# Patient Record
Sex: FEMALE | Race: WHITE
[De-identification: names, ages, dates, MRNs, and addresses within clinical notes are randomized per-mention and may not be internally consistent; named-entity substitution may affect disease eponyms.]

---

## 2017-08-30 ENCOUNTER — HOSPITAL ENCOUNTER (EMERGENCY)
Dept: HOSPITAL 41 - JD.ED | Age: 22
Discharge: HOME | End: 2017-08-30
Payer: COMMERCIAL

## 2017-08-30 VITALS — DIASTOLIC BLOOD PRESSURE: 78 MMHG | SYSTOLIC BLOOD PRESSURE: 112 MMHG

## 2017-08-30 DIAGNOSIS — G40.909: ICD-10-CM

## 2017-08-30 DIAGNOSIS — G44.209: Primary | ICD-10-CM

## 2017-08-30 DIAGNOSIS — G43.909: ICD-10-CM

## 2017-08-30 PROCEDURE — 96361 HYDRATE IV INFUSION ADD-ON: CPT

## 2017-08-30 PROCEDURE — 96374 THER/PROPH/DIAG INJ IV PUSH: CPT

## 2017-08-30 PROCEDURE — 96375 TX/PRO/DX INJ NEW DRUG ADDON: CPT

## 2017-08-30 PROCEDURE — 99283 EMERGENCY DEPT VISIT LOW MDM: CPT

## 2017-08-30 NOTE — EDM.PDOC
ED HPI GENERAL MEDICAL PROBLEM





- General


Chief Complaint: Headache


Stated Complaint: MIGRAINE


Time Seen by Provider: 08/30/17 21:40





- History of Present Illness


INITIAL COMMENTS - FREE TEXT/NARRATIVE: 





21-year-old female returns emergency room with a migraine headache.





This headache started last night is progressively gotten worse it's been 

associated with photophobia and some nausea. She vomited earlier this evening. 

Patient has almost daily headaches. Her headache is on her left side mostly she 

has some neck discomfort and muscle tightness on the left side of her neck.





Patient has a significant history of seizure disorder the patient is on Keppra 

500 mg twice daily.


  ** Headache


Pain Score (Numeric/FACES): 8





- Related Data


 Allergies











Allergy/AdvReac Type Severity Reaction Status Date / Time


 


No Known Allergies Allergy   Verified 08/30/17 21:40











Home Meds: 


 Home Meds





Riboflavin [Vitamin B-2] 25 mg PO DAILY 07/20/16 [History]


levETIRAcetam [Keppra] 2,000 mg PO BID 07/20/16 [History]


Magnesium 100 mg PO BID 08/30/17 [History]











Past Medical History





- Past Health History


Medical/Surgical History: Denies Medical/Surgical History


Neurological History: Reports: Other (See Below)


Other Neuro History: epilepsy





Social & Family History





- Tobacco Use


Smoking Status *Q: Never Smoker


Second Hand Smoke Exposure: No





- Alcohol Use


Days Per Week of Alcohol Use: 0





- Recreational Drug Use


Recreational Drug Use: No





ED ROS GENERAL





- Review of Systems


Review Of Systems: See Below


Constitutional: Reports: No Symptoms


HEENT: Reports: Other (She has some associated photophobia)


Respiratory: Reports: No Symptoms


Cardiovascular: Reports: No Symptoms


GI/Abdominal: Reports: No Symptoms


Musculoskeletal: Reports: Neck Pain (She has left-sided)


Neurological: Reports: Headache.  Denies: Pre-Existing Deficit, Seizure, Syncope


Psychiatric: Reports: No Symptoms





- Physical Exam


Exam: See Below


Exam Limited By: No Limitations


General Appearance: Alert, No Apparent Distress


Eye Exam: Bilateral Eye: EOMI, Normal Inspection, PERRL


Ears: Normal External Exam, Normal Canal, Hearing Grossly Normal, Normal TMs


Nose: Normal Inspection, Normal Mucosa, No Blood


Throat/Mouth: Normal Inspection, Normal Lips, Normal Teeth, Normal Gums, Normal 

Oropharynx, Normal Voice, No Airway Compromise


Head Exam: Atraumatic, Normocephalic


Neck: Normal Inspection, Supple, Full Range of Motion, Other (He has some left-

sided paraspinous muscle tenderness worse at the origination with the skull 

this could be a contributor to her headache.).  No: Lymphadenopathy (L), 

Lymphadenopathy (R)


Respiratory/Chest: No Respiratory Distress, Lungs Clear, Normal Breath Sounds


Cardiovascular: Regular Rate, Rhythm, No Edema, No Murmur


Neuro Exam (Abbreviated): Alert, Oriented, Normal Cognition, Other (Cranial 

nerves II through XII grossly intact all muscle groups in upper extremities 

recall appropriate bilaterally the patient can stand without difficulty no 

apparent muscle group weakness in the lower extremities cerebellar testing 

including finger to nose and drinking down her foot down the opposing leg are 

all negative deep tendon reflexes the brachial radialis are equal and 

appropriate bilaterally)


Back Exam: Normal Inspection, CVA Tenderness (L).  No: CVA Tenderness (R), 

Muscle Spasm, Vertebral Tenderness





Course





- Vital Signs


Last Recorded V/S: 


 Last Vital Signs











Temp  36.4 C   08/30/17 21:38


 


Pulse  82   08/30/17 21:38


 


Resp  16   08/30/17 21:38


 


BP  112/78   08/30/17 21:38


 


Pulse Ox  98   08/30/17 21:38














- Orders/Labs/Meds


Meds: 


Medications














Discontinued Medications














Generic Name Dose Route Start Last Admin





  Trade Name Lexi  PRN Reason Stop Dose Admin


 


Diphenhydramine HCl  50 mg  08/30/17 21:55  08/30/17 22:19





  Benadryl  IVPUSH  08/30/17 21:56  50 mg





  ONETIME ONE   Administration


 


Lactated Ringer's  1,000 mls @ 999 mls/hr  08/30/17 21:55  08/30/17 22:22





  Ringers, Lactated  IV  08/30/17 22:55  999 mls/hr





  .BOLUS ONE   Administration


 


Ondansetron HCl  4 mg  08/30/17 21:55  08/30/17 22:16





  Zofran  IVPUSH  08/30/17 21:56  4 mg





  ONETIME ONE   Administration














- Re-Assessments/Exams


Free Text/Narrative Re-Assessment/Exam: 





08/30/17 22:15


Appears to be a typical headache with migrainous tendencies with perhaps a 

muscle tension component. The patient received fluids Benadryl Zofran.





The father asked if perhaps her Keppra could be contributing to her headaches. 

I did look this up it is reported that 14-19% of people in [place] to have 

headaches. This does not mean the Keppra is causing it but they should discuss 

this with their neurologist. Also advised the patient do simple maneuvers such 

as heat or cold therapy to the left muscles of the neck perhaps chiropractic 

manipulation or massage therapy.





08/30/17 23:14


Patient is doing much better better than 50% improved she would like to go home 

and get some sleep.





Departure





- Departure


Time of Disposition: 23:15


Disposition: Home, Self-Care 01


Clinical Impression: 


 Tension-type headache, Migraine headache








- Discharge Information


Referrals: 


Ninoska Butt MD [Primary Care Provider] - 


Forms:  ED Department Discharge


Additional Instructions: 


Return to the emergency room with any questions problems or worsening symptoms.





Follow-up with your neurologist and discuss the possibility of Keppra may be 

triggering headaches. It is possible that other antiepileptic medications could 

have the same effect with perhaps more side effects than the Keppra. Try ice or 

heat to the muscles in the neck to see if this perhaps will help with the neck 

discomfort and potentially headache triggers. Chiropractic manipulation or 

massage therapy may well be beneficial as well.





Follow-up with your regular physician this next week if needed.





This evening go home and sleep do not eat anything heavy.

## 2017-12-07 ENCOUNTER — HOSPITAL ENCOUNTER (EMERGENCY)
Dept: HOSPITAL 41 - JD.ED | Age: 22
Discharge: HOME | End: 2017-12-07
Payer: COMMERCIAL

## 2017-12-07 VITALS — DIASTOLIC BLOOD PRESSURE: 83 MMHG | SYSTOLIC BLOOD PRESSURE: 127 MMHG

## 2017-12-07 DIAGNOSIS — Z79.899: ICD-10-CM

## 2017-12-07 DIAGNOSIS — R10.2: Primary | ICD-10-CM

## 2017-12-07 PROCEDURE — 83690 ASSAY OF LIPASE: CPT

## 2017-12-07 PROCEDURE — 74177 CT ABD & PELVIS W/CONTRAST: CPT

## 2017-12-07 PROCEDURE — 36415 COLL VENOUS BLD VENIPUNCTURE: CPT

## 2017-12-07 PROCEDURE — 81001 URINALYSIS AUTO W/SCOPE: CPT

## 2017-12-07 PROCEDURE — 85025 COMPLETE CBC W/AUTO DIFF WBC: CPT

## 2017-12-07 PROCEDURE — 96374 THER/PROPH/DIAG INJ IV PUSH: CPT

## 2017-12-07 PROCEDURE — 81025 URINE PREGNANCY TEST: CPT

## 2017-12-07 PROCEDURE — 99284 EMERGENCY DEPT VISIT MOD MDM: CPT

## 2017-12-07 PROCEDURE — 96361 HYDRATE IV INFUSION ADD-ON: CPT

## 2017-12-07 PROCEDURE — 80053 COMPREHEN METABOLIC PANEL: CPT

## 2017-12-07 NOTE — EDM.PDOC
ED HPI GENERAL MEDICAL PROBLEM





- General


Chief Complaint: Abdominal Pain


Stated Complaint: LOWER ABDOMINAL PAIN


Time Seen by Provider: 12/07/17 19:08


Source of Information: Reports: Patient, RN Notes Reviewed


History Limitations: Reports: No Limitations





- History of Present Illness


INITIAL COMMENTS - FREE TEXT/NARRATIVE: 





The patient states that she developed periumbilical abdominal pain this past 

Tuesday morning, 12/5/2017. The pain then began to migrate to her right lower 

quadrant yesterday, and settled in her right lower quadrant today. It is 

stabbing and pressing in character. The pain is always present, but made worse 

with virtually any movement. She has had nausea, but no emesis. She has been 

constipated since Tuesday, 12/5/2017. No recent diarrhea. No recent fever. She 

reports urinary frequency, but no dysuria. No prior similar symptoms.





The patient's LMP was 11/17/2017.





Her last oral solid intake was at 17:00 this evening. Her last oral liquid 

intake was at 17:00 this evening.





The patient does not have a PCP. Her Neurologist is at [facility].








  ** Right Lower Abdomen


Pain Score (Numeric/FACES): 7





- Related Data


 Allergies











Allergy/AdvReac Type Severity Reaction Status Date / Time


 


No Known Allergies Allergy   Verified 08/30/17 21:40











Home Meds: 


 Home Meds





Riboflavin [Vitamin B-2] 25 mg PO DAILY 07/20/16 [History]


levETIRAcetam [Keppra] 2,000 mg PO BID 07/20/16 [History]


Magnesium 100 mg PO BID 08/30/17 [History]











Past Medical History


Neurological History: Reports: Migraines, Seizure





- Past Surgical History


HEENT Surgical History: Reports: Oral Surgery ([place] teeth extraction)


Oncologic Surgical History: Reports: Other (See Below) (Right axillary lymph 

node biopsy - benign)





Social & Family History





- Tobacco Use


Smoking Status *Q: Never Smoker


Second Hand Smoke Exposure: No





- Caffeine Use


Caffeine Use: Reports: Coffee





- Alcohol Use


Alcohol Use History: Yes


Alcohol Use Frequency: Socially





- Recreational Drug Use


Recreational Drug Use: No





- Living Situation & Occupation


Living situation: Reports: Single, with Family


Occupation: Student ([employer])





ED ROS GENERAL





- Review of Systems


Review Of Systems: See Below


Constitutional: Reports: No Symptoms


HEENT: Reports: No Symptoms


Respiratory: Reports: No Symptoms


Cardiovascular: Reports: No Symptoms


Endocrine: Reports: No Symptoms


GI/Abdominal: Reports: No Symptoms


: Reports: No Symptoms


Musculoskeletal: Reports: No Symptoms


Skin: Reports: No Symptoms


Neurological: Reports: No Symptoms


Psychiatric: Reports: No Symptoms


Hematologic/Lymphatic: Reports: No Symptoms


Immunologic: Reports: No Symptoms





ED EXAM, GI/ABD





- Physical Exam


Exam: See Below


Exam Limited By: No Limitations


General Appearance: Alert, WD/WN, No Apparent Distress


Eyes: Bilateral: Normal Appearance, EOMI


Ears: Normal External Exam, Hearing Grossly Normal


Nose: Normal Inspection, No Blood


Throat/Mouth: Normal Inspection, Normal Lips, Normal Voice, No Airway Compromise


Head: Atraumatic, Normocephalic


Neck: Normal Inspection, Full Range of Motion


Respiratory/Chest: No Respiratory Distress, Lungs Clear, Normal Breath Sounds, 

No Accessory Muscle Use


Cardiovascular: Normal Peripheral Pulses, Regular Rate, Rhythm, No Gallop, No 

JVD, No Murmur, No Rub


GI/Abdominal Exam: Soft, No Organomegaly, No Distention, No Abnormal Bruit, No 

Mass, Pelvis Stable, Tender (Right lower quadrant only. Rovsing sign positive. 

Obturator sign positive. Psoas sign positive. Heel drop sign positive.), 

Abnormal Bowel Sounds (rare).  No: Rebound


 (Female) Exam: Deferred


Rectal (Female) Exam: Deferred


Back Exam: Normal Inspection, Full Range of Motion.  No: CVA Tenderness (L), 

CVA Tenderness (R)


Extremities: Normal Inspection, Normal Range of Motion, No Pedal Edema, Normal 

Capillary Refill


Neurological: Alert, Oriented, Normal Cognition, No Motor/Sensory Deficits


Psychiatric: Normal Affect


Skin Exam: Warm, Dry, Intact, Normal Color, No Rash





Course





- Vital Signs


Last Recorded V/S: 


 Last Vital Signs











Temp  36.2 C   12/07/17 19:05


 


Pulse  73   12/07/17 19:05


 


Resp  20   12/07/17 19:05


 


BP  127/83   12/07/17 19:05


 


Pulse Ox  100   12/07/17 19:05














- Orders/Labs/Meds


Orders: 


 Active Orders 24 hr











 Category Date Time Status


 


 Abdomen Pelvis w Cont [CT] Stat Exams  12/07/17 19:20 Taken


 


 Transvaginal Non OB [US] Stat Exams  12/07/17 21:21 Ordered


 


 Sodium Chloride 0.9% [Normal Saline] 1,000 ml Med  12/07/17 19:30 Active





 IV ASDIRECTED   


 


 Sodium Chloride 0.9% [Saline Flush] Med  12/07/17 20:38 Active





 10 ml FLUSH ONETIME PRN   








 Medication Orders





Sodium Chloride (Normal Saline)  1,000 mls @ 150 mls/hr IV ASDIRECTED VENECIA


   Last Admin: 12/07/17 19:29  Dose: 150 mls/hr


Sodium Chloride (Saline Flush)  10 ml FLUSH ONETIME PRN


   PRN Reason: IV FLUSH


   Last Admin: 12/07/17 20:53  Dose: 10 ml








Labs: 


 Laboratory Tests











  12/07/17 12/07/17 12/07/17 Range/Units





  19:18 19:18 20:14 


 


WBC  9.58    (3.98-10.04)  K/mm3


 


RBC  5.10    (3.98-5.22)  M/mm3


 


Hgb  13.4    (11.2-15.7)  gm/L


 


Hct  41.1    (34.1-44.9)  %


 


MCV  80.6    (79.4-94.8)  fl


 


MCH  26.3    (25.6-32.2)  pg


 


MCHC  32.6    (32.2-35.5)  g/dl


 


RDW Std Deviation  38.1    (36.4-46.3)  fL


 


Plt Count  298    (182-369)  K/mm3


 


MPV  9.2 L    (9.4-12.3)  fl


 


Neutrophils % (Manual)  56    (40-60)  %


 


Band Neutrophils %  0    (0-10)  %


 


Lymphocytes % (Manual)  42 H    (20-40)  %


 


Atypical Lymphs %  0    %


 


Monocytes % (Manual)  1 L    (2-10)  %


 


Eosinophils % (Manual)  1    (0.7-5.8)  %


 


Basophils % (Manual)  0 L    (0.1-1.2)  


 


Platelet Estimate  Adequate    


 


RBC Morph Comment  Normal    


 


Sodium   138   (136-145)  mEq/L


 


Potassium   3.6   (3.5-5.1)  mEq/L


 


Chloride   104   ()  mEq/L


 


Carbon Dioxide   26   (21-32)  mEq/L


 


Anion Gap   11.6   (5-15)  


 


BUN   8   (7-18)  mg/dL


 


Creatinine   0.8   (0.55-1.02)  mg/dL


 


Est Cr Clr Drug Dosing   106.03   mL/min


 


Estimated GFR (MDRD)   > 60   (>60)  mL/min


 


BUN/Creatinine Ratio   10.0 L   (14-18)  


 


Glucose   93   ()  mg/dL


 


Calcium   9.8   (8.5-10.1)  mg/dL


 


Total Bilirubin   0.2   (0.2-1.0)  mg/dL


 


AST   16   (15-37)  U/L


 


ALT   28   (14-59)  U/L


 


Alkaline Phosphatase   67   ()  U/L


 


Total Protein   8.5 H   (6.4-8.2)  g/dl


 


Albumin   4.5   (3.4-5.0)  g/dl


 


Globulin   4.0   gm/dL


 


Albumin/Globulin Ratio   1.1   (1-2)  


 


Lipase   228   ()  U/L


 


Urine Color     (Yellow)  


 


Urine Appearance     (Clear)  


 


Urine pH     (5.0-8.0)  


 


Ur Specific Gravity     (1.005-1.030)  


 


Urine Protein     (Negative)  


 


Urine Glucose (UA)     (Negative)  


 


Urine Ketones     (Negative)  


 


Urine Occult Blood     (Negative)  


 


Urine Nitrite     (Negative)  


 


Urine Bilirubin     (Negative)  


 


Urine Urobilinogen     (0.2-1.0)  


 


Ur Leukocyte Esterase     (Negative)  


 


Urine RBC     (0-5)  /hpf


 


Urine WBC     (0-5)  /hpf


 


Ur Epithelial Cells     (0-5)  /hpf


 


Urine Bacteria     (FEW)  /hpf


 


Urine Mucus     (FEW)  /hpf


 


Urine HCG, Qual    Negative  (NEGATIVE)  














  12/07/17 Range/Units





  20:14 


 


WBC   (3.98-10.04)  K/mm3


 


RBC   (3.98-5.22)  M/mm3


 


Hgb   (11.2-15.7)  gm/L


 


Hct   (34.1-44.9)  %


 


MCV   (79.4-94.8)  fl


 


MCH   (25.6-32.2)  pg


 


MCHC   (32.2-35.5)  g/dl


 


RDW Std Deviation   (36.4-46.3)  fL


 


Plt Count   (182-369)  K/mm3


 


MPV   (9.4-12.3)  fl


 


Neutrophils % (Manual)   (40-60)  %


 


Band Neutrophils %   (0-10)  %


 


Lymphocytes % (Manual)   (20-40)  %


 


Atypical Lymphs %   %


 


Monocytes % (Manual)   (2-10)  %


 


Eosinophils % (Manual)   (0.7-5.8)  %


 


Basophils % (Manual)   (0.1-1.2)  


 


Platelet Estimate   


 


RBC Morph Comment   


 


Sodium   (136-145)  mEq/L


 


Potassium   (3.5-5.1)  mEq/L


 


Chloride   ()  mEq/L


 


Carbon Dioxide   (21-32)  mEq/L


 


Anion Gap   (5-15)  


 


BUN   (7-18)  mg/dL


 


Creatinine   (0.55-1.02)  mg/dL


 


Est Cr Clr Drug Dosing   mL/min


 


Estimated GFR (MDRD)   (>60)  mL/min


 


BUN/Creatinine Ratio   (14-18)  


 


Glucose   ()  mg/dL


 


Calcium   (8.5-10.1)  mg/dL


 


Total Bilirubin   (0.2-1.0)  mg/dL


 


AST   (15-37)  U/L


 


ALT   (14-59)  U/L


 


Alkaline Phosphatase   ()  U/L


 


Total Protein   (6.4-8.2)  g/dl


 


Albumin   (3.4-5.0)  g/dl


 


Globulin   gm/dL


 


Albumin/Globulin Ratio   (1-2)  


 


Lipase   ()  U/L


 


Urine Color  Yellow  (Yellow)  


 


Urine Appearance  Slt cloudy H  (Clear)  


 


Urine pH  6.0  (5.0-8.0)  


 


Ur Specific Gravity  1.020  (1.005-1.030)  


 


Urine Protein  Negative  (Negative)  


 


Urine Glucose (UA)  Negative  (Negative)  


 


Urine Ketones  Negative  (Negative)  


 


Urine Occult Blood  Trace-intact H  (Negative)  


 


Urine Nitrite  Negative  (Negative)  


 


Urine Bilirubin  Negative  (Negative)  


 


Urine Urobilinogen  0.2  (0.2-1.0)  


 


Ur Leukocyte Esterase  Negative  (Negative)  


 


Urine RBC  0-5  (0-5)  /hpf


 


Urine WBC  0-5  (0-5)  /hpf


 


Ur Epithelial Cells  0-5  (0-5)  /hpf


 


Urine Bacteria  Few  (FEW)  /hpf


 


Urine Mucus  Few  (FEW)  /hpf


 


Urine HCG, Qual   (NEGATIVE)  











Meds: 


Medications











Generic Name Dose Route Start Last Admin





  Trade Name Freq  PRN Reason Stop Dose Admin


 


Sodium Chloride  1,000 mls @ 150 mls/hr  12/07/17 19:30  12/07/17 19:29





  Normal Saline  IV   150 mls/hr





  ASDIRECTED VENECIA   Administration


 


Sodium Chloride  10 ml  12/07/17 20:38  12/07/17 20:53





  Saline Flush  FLUSH   10 ml





  ONETIME PRN   Administration





  IV FLUSH   














Discontinued Medications














Generic Name Dose Route Start Last Admin





  Trade Name Freq  PRN Reason Stop Dose Admin


 


Iopamidol  150 ml  12/07/17 20:38  12/07/17 20:53





  Isovue-300 (61%)  IVPUSH  12/07/17 20:39  110 ml





  ONETIME ONE   Administration


 


Ondansetron HCl  4 mg  12/07/17 19:20  12/07/17 19:30





  Zofran  IVPUSH  12/07/17 19:21  4 mg





  ONETIME ONE   Administration














- Re-Assessments/Exams


Free Text/Narrative Re-Assessment/Exam: 





12/07/17 19:33


The patient declined an offer for pain medication, however, accepted an offer 

for anti-nausea medicine. I asked her to notify the nurse if she changes her 

mind about the pain medicine.








12/07/17 21:22


CT of the abdomen and pelvis with oral and IV contrast is read by [facility] as:


No sign of acute intra-abdominal pathology. Normal appendix.


No significant change.








The report of the CT scan indicates "The uterus and ovaries are within normal 

limits. There are no adnexal masses." This significantly reduces the likelihood 

that the patient's pain is due to an ovarian cyst. Ovarian torsion is now of 

primary concern. I have ordered a transvaginal ultrasound to evaluate.








12/07/17 21:27


The above was discussed with the patient. She is still declining an offer for 

pain medication.








12/07/17 22:11


Notified by the ultrasound technician that the patient is adamantly refusing 

the transvaginal ultrasound, telling him that even placing a tampon causes her 

to have a seizure. The patient was returned to her room. I went and talked to 

her, explaining that I believe that her pain is real, and that I am concerned 

that there is significant pathology. Based on her history, physical examination

, and negative CT scan, I am concerned that she has a torsioned right ovary. I 

explained why the CT scan is not able to diagnose an ovarian torsion. I 

explained that if she does in fact have a torsioned ovary, that time is of the 

essence, if she is to save the ovary, and that a transvaginal ultrasound is the 

only available way to make the diagnosis. I explained that if the ovary dies, 

she will lose 50% of her fertility. The patient initially stated that she 

wanted to go home and discuss this with her mother, however, she is now going 

to call her mother and discuss it before rendering a decision.








12/07/17 22:28


The patient has decided to not proceed with the transvaginal ultrasound. She 

prefers to follow-up with her Gynecologist. I will have her sign out AMA.





Departure





- Departure


Time of Disposition: 22:30


Disposition: Home, Self-Care 01


Condition: Fair


Clinical Impression: 


 Acute pelvic pain, female








- Discharge Information


Referrals: 


PCP,None [Primary Care Provider] - 


Forms:  ED Department Discharge


Additional Instructions: 


You were seen in the emergency room for lower right abdominal pain, nausea, 

constipation, and frequent urination.





Workup in the ER included blood work, a urinalysis, a urine pregnancy test, and 

a CT scan of your abdomen and pelvis.





Your entire workup was unremarkable, and does not explain the cause of your 

pain.





Based on your history, physical examination, and the negative tests above, we 

are very concerned that you have right ovarian torsion.





Time is of the essence to diagnose an ovarian torsion, because if it is not 

diagnosed quickly enough, the ovary will likely die, and if it dies, you would 

lose 50% of your fertility.





A transvaginal ultrasound is necessary to diagnose an ovarian torsion. This was 

offered to you, but declined.





You have elected to go home and follow-up with your Gynecologist. Because we 

are concerned that you have a genuine medical emergency, you have been asked to 

sign out AGAINST MEDICAL ADVICE.





If you change your mind, please do not hesitate to return to the ER for 

additional evaluation.





- My Orders


Last 24 Hours: 


My Active Orders





12/07/17 19:20


Abdomen Pelvis w Cont [CT] Stat 





12/07/17 19:30


Sodium Chloride 0.9% [Normal Saline] 1,000 ml IV ASDIRECTED 





12/07/17 20:38


Sodium Chloride 0.9% [Saline Flush]   10 ml FLUSH ONETIME PRN 





12/07/17 21:21


Transvaginal Non OB [US] Stat 














- Assessment/Plan


Last 24 Hours: 


My Active Orders





12/07/17 19:20


Abdomen Pelvis w Cont [CT] Stat 





12/07/17 19:30


Sodium Chloride 0.9% [Normal Saline] 1,000 ml IV ASDIRECTED 





12/07/17 20:38


Sodium Chloride 0.9% [Saline Flush]   10 ml FLUSH ONETIME PRN 





12/07/17 21:21


Transvaginal Non OB [US] Stat

## 2017-12-08 NOTE — CT
CT abdomen and pelvis

 

Technique: Multiple axial sections were obtained from the top the 

liver inferiorly through the pubic symphysis.  Intravenous and oral 

contrast has been given.  Delayed images were obtained through the 

bladder.

 

Comparison: Previous CT abdomen and pelvis exam of 12/21/11.

 

Findings: Visualized lung bases are clear.  Liver shows no focal 

abnormality.  Spleen appears within normal limits.  Several accessory 

splenic nodules noted medial to the spleen which are incidental.  

Adrenal glands show no nodule.  Pancreas is within normal limits.  

Gallbladder contains no calcified gallstones.  Kidneys show symmetric 

contrast enhancement without hydronephrosis or mass.  Aorta shows no 

aneurysmal dilatation.  No retroperitoneal adenopathy or mesenteric 

abnormalities are seen.  No pelvic mass or adenopathy is seen.  

Appendix is identified which appears within normal limits.  No 

inflammatory change or free fluid is seen.

 

Delayed images show contrast within the distal ureters and within the 

bladder.

 

Bone window settings were reviewed which show scoliosis within the 

spine.  Small limbus type vertebra is incidentally noted within the 

anterior and superior endplate of L4.

 

Impression:

1.  Incidental findings.  Nothing acute is appreciated on CT study of 

the abdomen and pelvis.

 

Diagnostic code #2

 

I agree with preliminary report issued by [employer] ([facility] 

preliminary report dictated on 12/07/17, 10:17 PM Central Time)

## 2023-01-25 ENCOUNTER — HOSPITAL ENCOUNTER (OUTPATIENT)
Dept: HOSPITAL 41 - JD.ED | Age: 28
Discharge: HOME | End: 2023-01-25
Attending: OBSTETRICS & GYNECOLOGY
Payer: COMMERCIAL

## 2023-01-25 VITALS — DIASTOLIC BLOOD PRESSURE: 71 MMHG | SYSTOLIC BLOOD PRESSURE: 117 MMHG | HEART RATE: 85 BPM

## 2023-01-25 DIAGNOSIS — F41.9: ICD-10-CM

## 2023-01-25 DIAGNOSIS — F17.290: ICD-10-CM

## 2023-01-25 DIAGNOSIS — O03.4: Primary | ICD-10-CM

## 2023-01-25 DIAGNOSIS — G43.909: ICD-10-CM

## 2023-01-25 DIAGNOSIS — Z79.899: ICD-10-CM

## 2023-01-25 DIAGNOSIS — O02.9: ICD-10-CM

## 2023-01-25 DIAGNOSIS — Z98.890: ICD-10-CM

## 2023-01-25 DIAGNOSIS — F32.A: ICD-10-CM

## 2023-01-25 LAB — EGFRCR SERPLBLD CKD-EPI 2021: 105 ML/MIN (ref 60–?)

## 2023-01-25 PROCEDURE — 86901 BLOOD TYPING SEROLOGIC RH(D): CPT

## 2023-01-25 PROCEDURE — 96376 TX/PRO/DX INJ SAME DRUG ADON: CPT

## 2023-01-25 PROCEDURE — 86850 RBC ANTIBODY SCREEN: CPT

## 2023-01-25 PROCEDURE — 59812 TREATMENT OF MISCARRIAGE: CPT

## 2023-01-25 PROCEDURE — 99285 EMERGENCY DEPT VISIT HI MDM: CPT

## 2023-01-25 PROCEDURE — 76856 US EXAM PELVIC COMPLETE: CPT

## 2023-01-25 PROCEDURE — 85025 COMPLETE CBC W/AUTO DIFF WBC: CPT

## 2023-01-25 PROCEDURE — 96375 TX/PRO/DX INJ NEW DRUG ADDON: CPT

## 2023-01-25 PROCEDURE — 83735 ASSAY OF MAGNESIUM: CPT

## 2023-01-25 PROCEDURE — 96374 THER/PROPH/DIAG INJ IV PUSH: CPT

## 2023-01-25 PROCEDURE — 80053 COMPREHEN METABOLIC PANEL: CPT

## 2023-01-25 PROCEDURE — 86900 BLOOD TYPING SEROLOGIC ABO: CPT

## 2023-01-25 PROCEDURE — 36415 COLL VENOUS BLD VENIPUNCTURE: CPT
